# Patient Record
Sex: MALE | Race: WHITE | NOT HISPANIC OR LATINO
[De-identification: names, ages, dates, MRNs, and addresses within clinical notes are randomized per-mention and may not be internally consistent; named-entity substitution may affect disease eponyms.]

---

## 2020-11-03 ENCOUNTER — APPOINTMENT (OUTPATIENT)
Dept: PULMONOLOGY | Facility: HOSPITAL | Age: 50
End: 2020-11-03
Payer: COMMERCIAL

## 2020-11-03 VITALS — HEIGHT: 70 IN | WEIGHT: 260 LBS | BODY MASS INDEX: 37.22 KG/M2

## 2020-11-03 DIAGNOSIS — F17.200 NICOTINE DEPENDENCE, UNSPECIFIED, UNCOMPLICATED: ICD-10-CM

## 2020-11-03 PROBLEM — Z00.00 ENCOUNTER FOR PREVENTIVE HEALTH EXAMINATION: Status: ACTIVE | Noted: 2020-11-03

## 2020-11-03 PROCEDURE — 99213 OFFICE O/P EST LOW 20 MIN: CPT | Mod: 95

## 2020-11-03 NOTE — REASON FOR VISIT
[Home] : at home, [unfilled] , at the time of the visit. [Medical Office: ([facility])___] : at the medical office located in  [Verbal consent obtained from patient] : the patient, [unfilled] [Initial Evaluation] : an initial evaluation [FreeTextEntry1] : sleep apnea

## 2020-11-03 NOTE — ASSESSMENT
[FreeTextEntry1] : 50-year-old man with severe obstructive sleep apnea on sleep study, patient was advised deviated nasal septal surgery by his ENT physician but he could not do the surgery due to the Covid pandemic..\par \par Patient has not undergone any treatment until now but has significant symptoms.  Today we went over details of his previous study and the next steps.  I suggested him to start the CPAP right away and see me in few weeks to look at the download data.

## 2020-11-03 NOTE — REVIEW OF SYSTEMS
[EDS: ESS=____] : daytime somnolence: ESS=[unfilled] [Fatigue] : fatigue [Snoring] : snoring [Witnessed Apneas] : witnessed apnea [Negative] : Psychiatric

## 2020-11-03 NOTE — HISTORY OF PRESENT ILLNESS
[FreeTextEntry1] : 50 year old man  with history of chronic back pain, sleep apnea is here in the sleep center to address sleep apnea.  Patient is sleepy with [place] sleepiness score of 12.  Patient has very loud snoring which disturbs his wife, also has witnessed apneas.  Patient's bedtime is around 10 PM wakes up in the morning around 6 AM.  He feels tired when he wakes up.  \par Patient underwent a sleep study which showed severe obstructive sleep apnea, he saw Dr. Sawyer after the diagnosis and was advised deviated nasal septal surgery by his ENT.\par \par Today he is coming to get evaluated for his sleep apnea as he did not want to do the deviated nasal septal surgery secondary to Covid pandemic.\par

## 2020-11-09 DIAGNOSIS — Z87.81 PERSONAL HISTORY OF (HEALED) TRAUMATIC FRACTURE: ICD-10-CM

## 2020-11-09 DIAGNOSIS — Z86.69 PERSONAL HISTORY OF OTHER DISEASES OF THE NERVOUS SYSTEM AND SENSE ORGANS: ICD-10-CM

## 2020-11-09 DIAGNOSIS — Z87.898 PERSONAL HISTORY OF OTHER SPECIFIED CONDITIONS: ICD-10-CM

## 2020-11-09 DIAGNOSIS — Z86.79 PERSONAL HISTORY OF OTHER DISEASES OF THE CIRCULATORY SYSTEM: ICD-10-CM

## 2020-11-09 DIAGNOSIS — Z82.49 FAMILY HISTORY OF ISCHEMIC HEART DISEASE AND OTHER DISEASES OF THE CIRCULATORY SYSTEM: ICD-10-CM

## 2020-11-10 ENCOUNTER — APPOINTMENT (OUTPATIENT)
Dept: HEMATOLOGY ONCOLOGY | Facility: CLINIC | Age: 50
End: 2020-11-10
Payer: COMMERCIAL

## 2020-11-10 VITALS
HEIGHT: 70 IN | DIASTOLIC BLOOD PRESSURE: 90 MMHG | SYSTOLIC BLOOD PRESSURE: 116 MMHG | TEMPERATURE: 98 F | BODY MASS INDEX: 38.37 KG/M2 | WEIGHT: 268 LBS | RESPIRATION RATE: 18 BRPM | HEART RATE: 80 BPM | OXYGEN SATURATION: 96 %

## 2020-11-10 DIAGNOSIS — Z80.42 FAMILY HISTORY OF MALIGNANT NEOPLASM OF PROSTATE: ICD-10-CM

## 2020-11-10 DIAGNOSIS — R10.9 UNSPECIFIED ABDOMINAL PAIN: ICD-10-CM

## 2020-11-10 PROCEDURE — 99205 OFFICE O/P NEW HI 60 MIN: CPT

## 2020-11-10 PROCEDURE — 99072 ADDL SUPL MATRL&STAF TM PHE: CPT

## 2020-11-10 NOTE — ASSESSMENT
[FreeTextEntry1] : THis is a 49 y/o man with   polycythemia and  leukocytosis. Patient has hx of obesity and smoking and untreated SASHA\par \par 1) leukocytosis \par differential primary versus secondary or reactive causes \par -for primary causes such as bone marrow pathology rule out MPD and leukemia \par check flow cytometry , bcr abl , jak2\par -for secondary causes- most likely includes tobacco use which causes leukocytosis \par other secondary causes include malignacy or chronic infections such as lyme - send lyme tiiters , hepatitis teting \par advise ct of c/a/p to rule out malginacy danielle ct chest to rule out malginacy given smoking hx \par advise patient to quit smoking \par if wbc remains high may need bm bx \par \par 2) polycythemia \par again reviewed possible hemoconcentraion \par causes include primary such as polycythemia vera versus secondary such as due to SASHA or smoking  or tumors\par -check jak2 , calr , mpl\par -check coritosl testosterone and epo level \par -needs to quit smoking \par -needs to start treatment for SASHA \par -advise ct of chest to rule out cancer (patient is currently declinging any imaging) \par -ct abd pelvis to rule out tumors in liver or kidneys\par moniter hb and if cont to increase will need phlebotomy \par \par 3) obesity \par advise weight loss \par \par 4) smoking hx \par discussed extensively the possible risk of many different cancers and vascular problems \par \par dw patient and wife at length \par follow up in 3 weeks

## 2020-11-10 NOTE — HISTORY OF PRESENT ILLNESS
[de-identified] : Mr. Londono is a 50 year old male, who presented to the ER at [facility] approximately 1 month ago after he experienced an episode of vertigo at work. He was treated with IV fluids and medications. He was found to have an elevated WBC while there, total WBC: 18.4. hb was also elevated at 16, plt were normal \par \par Just prior to the episode of vertigo, he suffered a back injury and was taking 20 OTC Advil for 7-10 days\par \par A week following the ER visit, on 10/08/2020 he saw Dr Mina, his PCP for follow up, his WBC was 12.0.  \par Labs dating back to 2018 also show mildly elevated wbc \par \par He continues to have low back pain. He has been taking Advil at lower doses now.\par Patient was diagnosed with sleep apnea over 1 year ago and has had no treatment yet. Per his wife his symptoms have been going on for years \par Patient continues to smoke \par He has never had any ct scans of chest  [de-identified] : Colonoscopy: Never\par \par Feeling tired at times, still having some lower back pain \par Declines any new complaints

## 2020-11-10 NOTE — REVIEW OF SYSTEMS
[Fatigue] : fatigue [Palpitations] : palpitations [Negative] : Allergic/Immunologic [Fever] : no fever [Chills] : no chills [Night Sweats] : no night sweats [Recent Change In Weight] : ~T recent weight change [Chest Pain] : no chest pain [Lower Ext Edema] : no lower extremity edema [Shortness Of Breath] : no shortness of breath [Wheezing] : no wheezing [Cough] : no cough [SOB on Exertion] : no shortness of breath during exertion [FreeTextEntry2] : low grade fatigue, attributes to sleep apnea.  Has gained weight over the past 2-3 years.  [FreeTextEntry5] : occasional palpitations, will make appt with Dr Patino.

## 2020-11-10 NOTE — PHYSICAL EXAM
[Obese] : obese [Normal] : no peripheral adenopathy appreciated [Restricted in physically strenuous activity but ambulatory and able to carry out work of a light or sedentary nature] : Status 1- Restricted in physically strenuous activity but ambulatory and able to carry out work of a light or sedentary nature, e.g., light house work, office work [de-identified] : obese

## 2020-11-10 NOTE — REASON FOR VISIT
[Initial Consultation] : an initial consultation for [FreeTextEntry2] : leukocytosis, here for intial hematology evaluation

## 2020-11-16 LAB
ALBUMIN MFR SERPL ELPH: 60 %
ALBUMIN SERPL ELPH-MCNC: 4.5 G/DL
ALBUMIN SERPL-MCNC: 4.3 G/DL
ALBUMIN/GLOB SERPL: 1.5 RATIO
ALP BLD-CCNC: 100 U/L
ALPHA1 GLOB MFR SERPL ELPH: 4.3 %
ALPHA1 GLOB SERPL ELPH-MCNC: 0.3 G/DL
ALPHA2 GLOB MFR SERPL ELPH: 10.4 %
ALPHA2 GLOB SERPL ELPH-MCNC: 0.7 G/DL
ALT SERPL-CCNC: 36 U/L
AMINO ACID MPL: NORMAL
AMINO ACID: NORMAL
ANION GAP SERPL CALC-SCNC: 23 MMOL/L
ASSAY DETAILS MPL: NORMAL
ASSAY DETAILS: NORMAL
AST SERPL-CCNC: 18 U/L
B BURGDOR AB SER-IMP: NEGATIVE
B BURGDOR IGM PATRN SER IB-IMP: NEGATIVE
B BURGDOR18KD IGG SER QL IB: NORMAL
B BURGDOR23KD IGG SER QL IB: NORMAL
B BURGDOR23KD IGM SER QL IB: NORMAL
B BURGDOR28KD IGG SER QL IB: NORMAL
B BURGDOR30KD IGG SER QL IB: NORMAL
B BURGDOR31KD IGG SER QL IB: NORMAL
B BURGDOR39KD IGG SER QL IB: NORMAL
B BURGDOR39KD IGM SER QL IB: NORMAL
B BURGDOR41KD IGG SER QL IB: PRESENT
B BURGDOR41KD IGM SER QL IB: PRESENT
B BURGDOR45KD IGG SER QL IB: NORMAL
B BURGDOR58KD IGG SER QL IB: NORMAL
B BURGDOR66KD IGG SER QL IB: PRESENT
B BURGDOR93KD IGG SER QL IB: NORMAL
B-GLOBULIN MFR SERPL ELPH: 10.9 %
B-GLOBULIN SERPL ELPH-MCNC: 0.8 G/DL
BASOPHILS # BLD AUTO: 0.07 K/UL
BASOPHILS NFR BLD AUTO: 0.6 %
BILIRUB SERPL-MCNC: 0.4 MG/DL
BLOCK/SPECIMEN ID: NORMAL
BLOCK/SPECIMEN ID: NORMAL
BUN SERPL-MCNC: 17 MG/DL
CALCIUM SERPL-MCNC: 9.1 MG/DL
CHLORIDE SERPL-SCNC: 99 MMOL/L
CO2 SERPL-SCNC: 22 MMOL/L
CORTICOSTEROID BIND GLOBULIN: 2.1 MG/DL
CORTIS SERPL-MCNC: 10 UG/DL
CORTISOL, FREE: 0.66 UG/DL
CREAT SERPL-MCNC: 0.8 MG/DL
DEPRECATED KAPPA LC FREE/LAMBDA SER: 1.17 RATIO
DEPRECATED KAPPA LC FREE/LAMBDA SER: 1.17 RATIO
EOSINOPHIL # BLD AUTO: 0.28 K/UL
EOSINOPHIL NFR BLD AUTO: 2.2 %
EPO SERPL-MCNC: 11.1 MIU/ML
EXON MPL: NORMAL
EXON: NORMAL
FERRITIN SERPL-MCNC: 354 NG/ML
FOLATE SERPL-MCNC: 14 NG/ML
GAMMA GLOB FLD ELPH-MCNC: 1 G/DL
GAMMA GLOB MFR SERPL ELPH: 14.4 %
GENE MPL: NORMAL
GENE XXX MUT ANL BLD/T: NORMAL
GENE: NORMAL
GLUCOSE SERPL-MCNC: 36 MG/DL
HBV SURFACE AB SER QL: NONREACTIVE
HBV SURFACE AG SER QL: NONREACTIVE
HCT VFR BLD CALC: 52.3 %
HCV AB SER QL: NONREACTIVE
HCV S/CO RATIO: 0.21 S/CO
HGB BLD-MCNC: 16.4 G/DL
IGA SER QL IEP: 138 MG/DL
IGG SER QL IEP: 998 MG/DL
IGM SER QL IEP: 89 MG/DL
IMM GRANULOCYTES NFR BLD AUTO: 0.2 %
INTERPRETATION SERPL IEP-IMP: NORMAL
INTERPRETATION: NORMAL
IRON SATN MFR SERPL: 28 %
IRON SERPL-MCNC: 84 UG/DL
JAK2 12 INTERPRETATION: NORMAL
JAK2 12 MUTATIONS: NORMAL
JAK2 12 REFERENCE: NORMAL
JAK2 12-13 TYPE: NORMAL
JAK2 GENE MUT ANL BLD/T: NORMAL
JAK2 P.V617F BLD/T QL: NOT DETECTED
JAK2-SOURCE: NORMAL
KAPPA LC CSF-MCNC: 1.4 MG/DL
KAPPA LC CSF-MCNC: 1.4 MG/DL
KAPPA LC SERPL-MCNC: 1.64 MG/DL
KAPPA LC SERPL-MCNC: 1.64 MG/DL
LDH SERPL-CCNC: 354 U/L
LYMPHOCYTES # BLD AUTO: 3.45 K/UL
LYMPHOCYTES NFR BLD AUTO: 27.3 %
Lab: NORMAL
Lab: NORMAL
M PROTEIN SPEC IFE-MCNC: NORMAL
MAN DIFF?: NORMAL
MCHC RBC-ENTMCNC: 30.6 PG
MCHC RBC-ENTMCNC: 31.4 GM/DL
MCV RBC AUTO: 97.6 FL
MONOCYTES # BLD AUTO: 0.8 K/UL
MONOCYTES NFR BLD AUTO: 6.3 %
MPL EXON 10 MUTATION: NOT DETECTED
MPL SPECIMEN SOURCE: NORMAL
MUTATION TYPE: NORMAL
MUTFREQ: NORMAL
NEUTROPHILS # BLD AUTO: 8.03 K/UL
NEUTROPHILS NFR BLD AUTO: 63.4 %
NUCCHA: NORMAL
NUKLEOID CHANGE: NORMAL
PFCX: 6.6 %
PLATELET # BLD AUTO: 291 K/UL
POTASSIUM SERPL-SCNC: 4.4 MMOL/L
PROT SERPL-MCNC: 7.1 G/DL
RBC # BLD: 5.36 M/UL
RBC # FLD: 13.5 %
REFERENCE MPL: NORMAL
SODIUM SERPL-SCNC: 144 MMOL/L
T(9;22)(ABL1,BCR)/CONTROL BLD/T: NORMAL
TESTOST SERPL-MCNC: 198 NG/DL
TIBC SERPL-MCNC: 296 UG/DL
TSH SERPL-ACNC: 2.58 UIU/ML
UIBC SERPL-MCNC: 212 UG/DL
VIT B12 SERPL-MCNC: 476 PG/ML
WBC # FLD AUTO: 12.66 K/UL

## 2020-11-24 ENCOUNTER — APPOINTMENT (OUTPATIENT)
Dept: HEMATOLOGY ONCOLOGY | Facility: CLINIC | Age: 50
End: 2020-11-24
Payer: COMMERCIAL

## 2020-11-24 ENCOUNTER — TRANSCRIPTION ENCOUNTER (OUTPATIENT)
Age: 50
End: 2020-11-24

## 2020-11-24 VITALS
TEMPERATURE: 98 F | WEIGHT: 269 LBS | HEIGHT: 70 IN | HEART RATE: 85 BPM | SYSTOLIC BLOOD PRESSURE: 120 MMHG | OXYGEN SATURATION: 98 % | RESPIRATION RATE: 18 BRPM | DIASTOLIC BLOOD PRESSURE: 86 MMHG | BODY MASS INDEX: 38.51 KG/M2

## 2020-11-24 PROCEDURE — 99214 OFFICE O/P EST MOD 30 MIN: CPT

## 2020-11-24 NOTE — PHYSICAL EXAM
[Restricted in physically strenuous activity but ambulatory and able to carry out work of a light or sedentary nature] : Status 1- Restricted in physically strenuous activity but ambulatory and able to carry out work of a light or sedentary nature, e.g., light house work, office work [Obese] : obese [Normal] : no peripheral adenopathy appreciated [de-identified] : obese

## 2020-11-24 NOTE — ASSESSMENT
[FreeTextEntry1] : THis is a 49 y/o man with   polycythemia and  leukocytosis. Patient has hx of obesity and smoking and untreated SASHA\par \par 1) leukocytosis \par differential primary versus secondary or reactive causes \par -still mild elevation not sig worse \par -labs reviewed -negative for BCR abl  jak2 mutation, calr and mpl  makes CML   or MPD unlikely \par -also negative epo testosterone and cortisol \par -still advise ct of chest and abd pelvis \par -advise smoking cessation  dw patient at length \par - discussed bm bx if fails to improve with smoking cessation \par \par 2) polycythemia \par again reviewed possible causes include primary such as polycythemia vera versus secondary such as due to SASHA or smoking  or tumors\par -negative  jak2 , calr , mpl makes primary polycythemia vera  less likley \par -negative  coritosl testosterone and epo level \par -again discussed importance of  quitting smoking \par -again stressed importance of  treatment for SASHA \par -advise ct of chest to rule out cancer (cont to decline ) \par -ct abd pelvis to rule out tumors in liver or kidneys advised (he cont to decline ) \par -hb and hct is onl upper end of normal , repeat every 4-6 weeks start asa \par \par 3) obesity \par advise weight loss \par \par 4) smoking hx \par discussed extensively the possible risk of many different cancers and vascular problems  and the possibel contribution to high wbc and high hb \par \par dw patient   at length \par follow up in 4  weeks

## 2020-11-24 NOTE — HISTORY OF PRESENT ILLNESS
[de-identified] : Mr. Londono is a 50 year old male, who presented to the ER at [facility] approximately 1 month ago after he experienced an episode of vertigo at work. He was treated with IV fluids and medications. He was found to have an elevated WBC while there, total WBC: 18.4. hb was also elevated at 16, plt were normal \par \par Just prior to the episode of vertigo, he suffered a back injury and was taking 20 OTC Advil for 7-10 days\par \par A week following the ER visit, on 10/08/2020 he saw Dr Mina, his PCP for follow up, his WBC was 12.0.  \par Labs dating back to 2018 also show mildly elevated wbc \par \par He continues to have low back pain. He has been taking Advil at lower doses now.\par Patient was diagnosed with sleep apnea over 1 year ago and has had no treatment yet. Per his wife his symptoms have been going on for years \par Patient continues to smoke \par He has never had any ct scans of chest  [de-identified] : Colonoscopy: Never\par patient is still smoking and still has not started using his cpap regularly \par \par labs reviewed : normal epo level , normal ferritin, jak2 negative  bcr abl, , mpl neg, calr neg, normal testosterone and cortisol \par normal flow cytometry, neg spep \par \par has not done ct or ultraosund yet \par \par Feeling tired at times, still having some lower back pain \par Declines any new complaints

## 2020-11-24 NOTE — REASON FOR VISIT
[Initial Consultation] : an initial consultation for [FreeTextEntry2] : leukocytosis, here for  eval of high wbc and high hb

## 2020-11-24 NOTE — REVIEW OF SYSTEMS
[Fatigue] : fatigue [Recent Change In Weight] : ~T recent weight change [Palpitations] : palpitations [Negative] : Allergic/Immunologic [Fever] : no fever [Chills] : no chills [Night Sweats] : no night sweats [Chest Pain] : no chest pain [Lower Ext Edema] : no lower extremity edema [Shortness Of Breath] : no shortness of breath [Wheezing] : no wheezing [Cough] : no cough [SOB on Exertion] : no shortness of breath during exertion [FreeTextEntry2] : low grade fatigue, attributes to sleep apnea. .  [FreeTextEntry5] : occasional palpitations, will make appt with Dr Patino.

## 2021-01-06 ENCOUNTER — APPOINTMENT (OUTPATIENT)
Dept: HEMATOLOGY ONCOLOGY | Facility: CLINIC | Age: 51
End: 2021-01-06
Payer: COMMERCIAL

## 2021-01-06 VITALS
TEMPERATURE: 98 F | BODY MASS INDEX: 39.37 KG/M2 | HEIGHT: 70 IN | OXYGEN SATURATION: 99 % | RESPIRATION RATE: 19 BRPM | DIASTOLIC BLOOD PRESSURE: 92 MMHG | HEART RATE: 86 BPM | SYSTOLIC BLOOD PRESSURE: 137 MMHG | WEIGHT: 275 LBS

## 2021-01-06 PROCEDURE — 99072 ADDL SUPL MATRL&STAF TM PHE: CPT

## 2021-01-06 PROCEDURE — 99214 OFFICE O/P EST MOD 30 MIN: CPT

## 2021-01-06 NOTE — REASON FOR VISIT
[Follow-Up Visit] : a follow-up visit for [FreeTextEntry2] : leukocytosis, here for  eval of high wbc and high hb

## 2021-01-06 NOTE — HISTORY OF PRESENT ILLNESS
[de-identified] : Mr. Londono is a 50 year old male, who presented to the ER at [facility] approximately 1 month ago after he experienced an episode of vertigo at work. He was treated with IV fluids and medications. He was found to have an elevated WBC while there, total WBC: 18.4. hb was also elevated at 16, plt were normal \par \par Just prior to the episode of vertigo, he suffered a back injury and was taking 20 OTC Advil for 7-10 days\par \par A week following the ER visit, on 10/08/2020 he saw Dr Mina, his PCP for follow up, his WBC was 12.0.  \par Labs dating back to 2018 also show mildly elevated wbc \par \par He continues to have low back pain. He has been taking Advil at lower doses now.\par Patient was diagnosed with sleep apnea over 1 year ago and has had no treatment yet. Per his wife his symptoms have been going on for years \par Patient continues to smoke \par \par \par labs reviewed : normal epo level , normal ferritin, jak2 negative  bcr abl, , mpl neg, calr neg, normal testosterone and cortisol \par normal flow cytometry, neg spep \par  [de-identified] : \par occ doing cpap , sleep study? redo in future \par still smoking , wants to quit \par saw cardioloy supposed to do stress test , not scheduled \par ct of chest- lung nodules \par ultrasoun d- shows possible liver lesion

## 2021-01-06 NOTE — PHYSICAL EXAM
[Restricted in physically strenuous activity but ambulatory and able to carry out work of a light or sedentary nature] : Status 1- Restricted in physically strenuous activity but ambulatory and able to carry out work of a light or sedentary nature, e.g., light house work, office work [Obese] : obese [Normal] : grossly intact [de-identified] : obese

## 2021-01-06 NOTE — ASSESSMENT
[FreeTextEntry1] : THis is a 49 y/o man with   polycythemia and  leukocytosis. Patient has hx of obesity and smoking and untreated SAHSA\par \par 1) leukocytosis \par differential primary versus secondary or reactive causes \par -wbc is normal today \par -work up negative for BCR abl  jak2 mutation, calr and mpl, epo testosterone and cortisol \par -ecouraged smoking cessation , gave script for wellbutrin \par -ct scan shows lung nodules , needs follow up and also do ct of abd pelvis now \par \par 2) polycythemia \par again reviewed possible causes include primary such as polycythemia vera versus secondary such as due to SASHA or smoking  or tumors\par -negative  jak2 , calr , mpl makes primary polycythemia vera  less likley \par -negative  coritosl testosterone and epo level \par -again discussed importance of  quitting smoking  start wellbutrin \par -advise follo wup with pulmonary  for SASHA \par -hb is still mildly elevated but stable \par -lung nodules on scans get ct of abd , will need repeat ct chest \par -discussed possible bm bx but will hold until patient gets sleep study and also has quit smoking \par \par \par 3) obesity \par advise weight loss \par \par 4) smoking hx \par discussed extensively the possible risk of many different cancers and vascular problems  and the possibel contribution to high wbc and high hb \par start well butrin \par \par 5) lung nodule \par get ct of abd pelvis \par repeat in 6 months \par \par 6) liver lesiion \par get ct of abd pelvis \par \par \par \par dw patient   at length \par follow up for results of ct scan \par follow up in 2 months MD

## 2021-01-09 LAB — FERRITIN SERPL-MCNC: 333 NG/ML

## 2021-03-03 ENCOUNTER — APPOINTMENT (OUTPATIENT)
Dept: HEMATOLOGY ONCOLOGY | Facility: CLINIC | Age: 51
End: 2021-03-03

## 2021-03-03 ENCOUNTER — NON-APPOINTMENT (OUTPATIENT)
Age: 51
End: 2021-03-03

## 2021-03-11 ENCOUNTER — NON-APPOINTMENT (OUTPATIENT)
Age: 51
End: 2021-03-11

## 2021-03-12 ENCOUNTER — APPOINTMENT (OUTPATIENT)
Dept: HEMATOLOGY ONCOLOGY | Facility: CLINIC | Age: 51
End: 2021-03-12

## 2021-03-30 ENCOUNTER — RESULT REVIEW (OUTPATIENT)
Age: 51
End: 2021-03-30

## 2021-03-31 ENCOUNTER — APPOINTMENT (OUTPATIENT)
Dept: HEMATOLOGY ONCOLOGY | Facility: CLINIC | Age: 51
End: 2021-03-31

## 2021-04-12 NOTE — REVIEW OF SYSTEMS
[Fever] : no fever [Chills] : no chills [Night Sweats] : no night sweats [Chest Pain] : no chest pain [Lower Ext Edema] : no lower extremity edema [Shortness Of Breath] : no shortness of breath [Wheezing] : no wheezing [Cough] : no cough [SOB on Exertion] : no shortness of breath during exertion [FreeTextEntry2] : low grade fatigue, attributes to sleep apnea. .  [FreeTextEntry5] : occasional palpitations, will make appt with Dr Patino.

## 2021-04-12 NOTE — ASSESSMENT
[FreeTextEntry1] : THis is a 51 y/o man with   polycythemia and  leukocytosis. Patient has hx of obesity and smoking and untreated SASHA\par \par 1) leukocytosis \par differential primary versus secondary or reactive causes \par -wbc is normal today \par -work up negative for BCR abl  jak2 mutation, calr and mpl, epo testosterone and cortisol \par -ecouraged smoking cessation , gave script for wellbutrin \par -ct scan shows lung nodules , needs follow up and also do ct of abd pelvis now \par \par 2) polycythemia \par again reviewed possible causes include primary such as polycythemia vera versus secondary such as due to SASHA or smoking  or tumors\par -negative  jak2 , calr , mpl makes primary polycythemia vera  less likley \par -negative  coritosl testosterone and epo level \par -again discussed importance of  quitting smoking  start wellbutrin \par -advise follo wup with pulmonary  for SASHA \par -hb is still mildly elevated but stable \par -lung nodules on scans get ct of abd , will need repeat ct chest \par -discussed possible bm bx but will hold until patient gets sleep study and also has quit smoking \par \par \par 3) obesity \par advise weight loss \par \par 4) smoking hx \par discussed extensively the possible risk of many different cancers and vascular problems  and the possibel contribution to high wbc and high hb \par start well butrin \par \par 5) lung nodule \par get ct of abd pelvis \par repeat in 6 months \par \par 6) liver lesiion \par get ct of abd pelvis \par \par \par \par dw patient   at length \par follow up for results of ct scan \par follow up in 2 months MD

## 2021-04-12 NOTE — HISTORY OF PRESENT ILLNESS
[de-identified] : Mr. Londono is a 50 year old male, who presented to the ER at [facility] approximately 1 month ago after he experienced an episode of vertigo at work. He was treated with IV fluids and medications. He was found to have an elevated WBC while there, total WBC: 18.4. hb was also elevated at 16, plt were normal \par \par Just prior to the episode of vertigo, he suffered a back injury and was taking 20 OTC Advil for 7-10 days\par \par A week following the ER visit, on 10/08/2020 he saw Dr Mina, his PCP for follow up, his WBC was 12.0.  \par Labs dating back to 2018 also show mildly elevated wbc \par \par He continues to have low back pain. He has been taking Advil at lower doses now.\par Patient was diagnosed with sleep apnea over 1 year ago and has had no treatment yet. Per his wife his symptoms have been going on for years \par Patient continues to smoke \par \par \par labs reviewed : normal epo level , normal ferritin, jak2 negative  bcr abl, , mpl neg, calr neg, normal testosterone and cortisol \par normal flow cytometry, neg spep \par  [de-identified] : \par occ doing cpap , sleep study? redo in future \par still smoking , wants to quit \par saw cardioloy supposed to do stress test , not scheduled \par ct of chest- lung nodules \par ultrasoun d- shows possible liver lesion

## 2021-04-13 ENCOUNTER — NON-APPOINTMENT (OUTPATIENT)
Age: 51
End: 2021-04-13

## 2021-04-13 ENCOUNTER — APPOINTMENT (OUTPATIENT)
Dept: HEMATOLOGY ONCOLOGY | Facility: CLINIC | Age: 51
End: 2021-04-13

## 2021-05-19 ENCOUNTER — APPOINTMENT (OUTPATIENT)
Dept: HEMATOLOGY ONCOLOGY | Facility: CLINIC | Age: 51
End: 2021-05-19

## 2021-05-28 ENCOUNTER — APPOINTMENT (OUTPATIENT)
Dept: HEMATOLOGY ONCOLOGY | Facility: CLINIC | Age: 51
End: 2021-05-28
Payer: COMMERCIAL

## 2021-05-28 VITALS
DIASTOLIC BLOOD PRESSURE: 87 MMHG | BODY MASS INDEX: 39.37 KG/M2 | WEIGHT: 275 LBS | HEIGHT: 70 IN | OXYGEN SATURATION: 98 % | RESPIRATION RATE: 18 BRPM | TEMPERATURE: 97.9 F | HEART RATE: 83 BPM | SYSTOLIC BLOOD PRESSURE: 154 MMHG

## 2021-05-28 PROCEDURE — 99214 OFFICE O/P EST MOD 30 MIN: CPT

## 2021-05-28 PROCEDURE — 99072 ADDL SUPL MATRL&STAF TM PHE: CPT

## 2021-05-28 NOTE — ASSESSMENT
[FreeTextEntry1] : THis is a 49 y/o man with   polycythemia and  leukocytosis. Patient has hx of obesity and smoking and untreated SASHA\par \par 1) leukocytosis \par differential primary versus secondary or reactive causes \par -work up negative for BCR abl  jak2 mutation, calr and mpl, epo testosterone and cortisol \par -again discussed need to quit smoking \par -ct scan shows lung nodules , needs follow up and also do ct of abd pelvis now \par - discussed need for bm bx to rule out MPD \par \par 2) polycythemia \par again reviewed possible causes include primary such as polycythemia vera versus secondary such as due to SASHA or smoking  or tumors\par -negative  jak2 , calr , mpl makes primary polycythemia vera  less likley \par -negative  coritosl testosterone and epo level \par -again stressed importance of quiting smoking and compliance with CPAP \par -repeat cbc \par - repeat ct scan of lung follow up now(6) \par -mri of liver reviewed - repeat in 6 months \par \par \par 3) obesity \par advise weight loss \par \par 4) smoking hx \par discussed extensively the possible risk of many different cancers and vascular problems  and the possibel contribution to high wbc and high hb \par advised to quit smoking \par \par 5) lung nodule \par mri of abd negative for maliangnancy \par needs repeat ct scan chest now \par \par 6) liver lesiion \par mri of abd reviewed --> hemangioma \par repeat in 6 months \par check afp \par \par \par \par dw patient  and wife at length \par follow up in 3 months

## 2021-05-28 NOTE — HISTORY OF PRESENT ILLNESS
[de-identified] : feeling well but is tired at times \par hasn’t been using cpap , trouble breathing \par saw ent , suggested surgery on septum \par did video call , pulmonologist - sent machine \par still smoking - trying to smoke \par cardiology did stress test - heart ok \par mri of liver - fatty liver , lesion 4.2 cm  consistant with hemangioma and bilateral renal cyst  [de-identified] : Mr. Londono is a 50 year old male, who presented to the ER at [facility] approximately 1 month ago after he experienced an episode of vertigo at work. He was treated with IV fluids and medications. He was found to have an elevated WBC while there, total WBC: 18.4. hb was also elevated at 16, plt were normal \par \par Just prior to the episode of vertigo, he suffered a back injury and was taking 20 OTC Advil for 7-10 days\par \par A week following the ER visit, on 10/08/2020 he saw Dr Mina, his PCP for follow up, his WBC was 12.0.  \par Labs dating back to 2018 also show mildly elevated wbc \par \par He continues to have low back pain. He has been taking Advil at lower doses now.\par Patient was diagnosed with sleep apnea over 1 year ago and has had no treatment yet. Per his wife his symptoms have been going on for years \par Patient continues to smoke \par \par \par labs reviewed : normal epo level , normal ferritin, jak2 negative  bcr abl, , mpl neg, calr neg, normal testosterone and cortisol \par normal flow cytometry, neg spep \par \par ct of chest- lung nodules \par ultrasoun d- shows possible liver lesion

## 2021-05-30 LAB
AFP-TM SERPL-MCNC: 2.3 NG/ML
ALBUMIN SERPL ELPH-MCNC: 4.4 G/DL
ALP BLD-CCNC: 103 U/L
ALT SERPL-CCNC: 40 U/L
ANION GAP SERPL CALC-SCNC: 25 MMOL/L
AST SERPL-CCNC: 19 U/L
BASOPHILS # BLD AUTO: 0.06 K/UL
BASOPHILS NFR BLD AUTO: 0.5 %
BILIRUB SERPL-MCNC: 0.5 MG/DL
BUN SERPL-MCNC: 18 MG/DL
CALCIUM SERPL-MCNC: 9.1 MG/DL
CHLORIDE SERPL-SCNC: 101 MMOL/L
CO2 SERPL-SCNC: 17 MMOL/L
CREAT SERPL-MCNC: 0.82 MG/DL
EOSINOPHIL # BLD AUTO: 0.35 K/UL
EOSINOPHIL NFR BLD AUTO: 3.1 %
GLUCOSE SERPL-MCNC: 105 MG/DL
HCT VFR BLD CALC: 50.2 %
HGB BLD-MCNC: 15.8 G/DL
IMM GRANULOCYTES NFR BLD AUTO: 0.4 %
LDH SERPL-CCNC: 296 U/L
LYMPHOCYTES # BLD AUTO: 2.94 K/UL
LYMPHOCYTES NFR BLD AUTO: 26.1 %
MAN DIFF?: NORMAL
MCHC RBC-ENTMCNC: 29.9 PG
MCHC RBC-ENTMCNC: 31.5 GM/DL
MCV RBC AUTO: 95.1 FL
MONOCYTES # BLD AUTO: 0.76 K/UL
MONOCYTES NFR BLD AUTO: 6.7 %
NEUTROPHILS # BLD AUTO: 7.13 K/UL
NEUTROPHILS NFR BLD AUTO: 63.2 %
PLATELET # BLD AUTO: 284 K/UL
POTASSIUM SERPL-SCNC: 4.4 MMOL/L
PROT SERPL-MCNC: 6.7 G/DL
RBC # BLD: 5.28 M/UL
RBC # FLD: 13.8 %
SODIUM SERPL-SCNC: 144 MMOL/L
WBC # FLD AUTO: 11.28 K/UL

## 2021-06-06 LAB
CORTICOSTEROID BIND GLOBULIN: 1.8 MG/DL
CORTIS SERPL-MCNC: 11 UG/DL
CORTISOL, FREE: 1.3 UG/DL
EPO SERPL-MCNC: 11.6 MIU/ML
PFCX: 12 %

## 2021-06-07 ENCOUNTER — NON-APPOINTMENT (OUTPATIENT)
Age: 51
End: 2021-06-07

## 2021-06-07 ENCOUNTER — APPOINTMENT (OUTPATIENT)
Dept: PULMONOLOGY | Facility: HOSPITAL | Age: 51
End: 2021-06-07
Payer: COMMERCIAL

## 2021-06-07 VITALS
WEIGHT: 275 LBS | BODY MASS INDEX: 39.37 KG/M2 | SYSTOLIC BLOOD PRESSURE: 140 MMHG | HEIGHT: 70 IN | HEART RATE: 83 BPM | DIASTOLIC BLOOD PRESSURE: 70 MMHG

## 2021-06-07 PROCEDURE — 99214 OFFICE O/P EST MOD 30 MIN: CPT

## 2021-06-07 RX ORDER — BUPROPION HYDROCHLORIDE 150 MG/1
150 TABLET, FILM COATED, EXTENDED RELEASE ORAL
Qty: 60 | Refills: 5 | Status: DISCONTINUED | COMMUNITY
Start: 2021-01-06 | End: 2021-06-07

## 2021-06-07 RX ORDER — IBUPROFEN 200 MG/1
200 TABLET, COATED ORAL
Refills: 0 | Status: DISCONTINUED | COMMUNITY
End: 2021-06-07

## 2021-06-07 NOTE — HISTORY OF PRESENT ILLNESS
[FreeTextEntry1] : 51 year old man  with history of chronic back pain, polycythmia and elevated white count, sleep apnea is here in the sleep center to address sleep apnea.  Patient is sleepy with [place] sleepiness score of 12.  Patient has very loud snoring which disturbs his wife, also has witnessed apneas.  Patient's bedtime is around 10 PM wakes up in the morning around 6 AM.  He feels tired when he wakes up.  \par \par He is given CPAP due to severity of apnea with AHI of 58.\par \par Mr. Londono has difficulty with the CPAP machine, will give him another nasal mask today to see if he has better luck in using it.  We discussed about alternative treatment choices today.  Patient has not underwent a study in more than 2 years.  Will repeat a sleep study to assess the current degree of apnea and then consider inspire or mandibular advancement surgery.\par

## 2021-06-07 NOTE — PHYSICAL EXAM
[General Appearance - Well Developed] : well developed [General Appearance - Well Nourished] : well nourished [II] : II [Neck Cervical Mass (___cm)] : no neck mass was observed [Jugular Venous Distention Increased] : there was no jugular-venous distention [Heart Sounds] : normal S1 and S2 [Murmurs] : no murmurs [] : no respiratory distress [Auscultation Breath Sounds / Voice Sounds] : lungs were clear to auscultation bilaterally [FreeTextEntry1] : no edema [No Focal Deficits] : no focal deficits [Oriented To Time, Place, And Person] : oriented to person, place, and time [Memory Recent] : recent memory was not impaired

## 2021-06-07 NOTE — ASSESSMENT
[FreeTextEntry1] : 51-year-old man with severe obstructive sleep apnea has difficulty using the CPAP machine.\par \par I asked him to continue to try using the CPAP machine until we figure out alternative treatment choices given the polycythemia and also elevated white count.\par \par We will repeat a sleep study to assess the current degree of apnea and see if he will qualify for alternative choices.  Patient's weight will be an issue as well and his BMI is 39 he may not qualify for inspire therapy at this point, I asked him to lose weight to see if he will qualify for inspire therapy or will consider mandibular advancement surgery.\par \par Also discussed about smoking cessation, he still smokes more than half pack a day.

## 2021-09-14 ENCOUNTER — APPOINTMENT (OUTPATIENT)
Dept: HEMATOLOGY ONCOLOGY | Facility: CLINIC | Age: 51
End: 2021-09-14
Payer: COMMERCIAL

## 2021-09-14 VITALS
RESPIRATION RATE: 18 BRPM | SYSTOLIC BLOOD PRESSURE: 142 MMHG | BODY MASS INDEX: 39.94 KG/M2 | OXYGEN SATURATION: 96 % | WEIGHT: 279 LBS | TEMPERATURE: 98.9 F | HEIGHT: 70 IN | DIASTOLIC BLOOD PRESSURE: 95 MMHG | HEART RATE: 77 BPM

## 2021-09-14 DIAGNOSIS — Z72.0 TOBACCO USE: ICD-10-CM

## 2021-09-14 DIAGNOSIS — G89.29 DORSALGIA, UNSPECIFIED: ICD-10-CM

## 2021-09-14 DIAGNOSIS — G47.33 OBSTRUCTIVE SLEEP APNEA (ADULT) (PEDIATRIC): ICD-10-CM

## 2021-09-14 DIAGNOSIS — M54.9 DORSALGIA, UNSPECIFIED: ICD-10-CM

## 2021-09-14 PROCEDURE — 99214 OFFICE O/P EST MOD 30 MIN: CPT

## 2021-09-19 LAB
AFP-TM SERPL-MCNC: 2.4 NG/ML
FERRITIN SERPL-MCNC: 401 NG/ML

## 2021-09-22 PROBLEM — G47.33 OBSTRUCTIVE SLEEP APNEA, ADULT: Status: ACTIVE | Noted: 2020-11-03

## 2021-09-22 PROBLEM — M54.9 CHRONIC BACK PAIN: Status: ACTIVE | Noted: 2020-11-03

## 2021-09-22 PROBLEM — Z72.0 TOBACCO ABUSE: Status: ACTIVE | Noted: 2020-11-10

## 2022-01-19 ENCOUNTER — APPOINTMENT (OUTPATIENT)
Dept: HEMATOLOGY ONCOLOGY | Facility: CLINIC | Age: 52
End: 2022-01-19

## 2023-01-10 ENCOUNTER — APPOINTMENT (OUTPATIENT)
Dept: HEMATOLOGY ONCOLOGY | Facility: CLINIC | Age: 53
End: 2023-01-10
Payer: COMMERCIAL

## 2023-01-10 VITALS
HEIGHT: 70 IN | DIASTOLIC BLOOD PRESSURE: 82 MMHG | SYSTOLIC BLOOD PRESSURE: 123 MMHG | WEIGHT: 294 LBS | TEMPERATURE: 99.3 F | RESPIRATION RATE: 18 BRPM | HEART RATE: 77 BPM | BODY MASS INDEX: 42.09 KG/M2 | OXYGEN SATURATION: 96 %

## 2023-01-10 PROCEDURE — 99213 OFFICE O/P EST LOW 20 MIN: CPT | Mod: 25

## 2023-01-10 PROCEDURE — 36415 COLL VENOUS BLD VENIPUNCTURE: CPT

## 2023-01-10 RX ORDER — AMOXICILLIN AND CLAVULANATE POTASSIUM 500; 125 MG/1; MG/1
500-125 TABLET, FILM COATED ORAL
Qty: 14 | Refills: 0 | Status: DISCONTINUED | COMMUNITY
Start: 2023-01-10 | End: 2023-01-10

## 2023-01-17 ENCOUNTER — APPOINTMENT (OUTPATIENT)
Dept: HEMATOLOGY ONCOLOGY | Facility: CLINIC | Age: 53
End: 2023-01-17

## 2023-01-19 NOTE — ASSESSMENT
[FreeTextEntry1] : THis is a 51 y/o man with   polycythemia and  leukocytosis. Patient has hx of obesity and smoking and untreated SASHA\par \par 1) leukocytosis \par differential primary versus secondary or reactive causes \par -work up negative for BCR abl  jak2 mutation, calr and mpl, epo testosterone and cortisol \par -again discussed need to quit smoking \par --  to consider bmbx \par \par 2) polycythemia \par again reviewed possible causes include primary such as polycythemia vera versus secondary such as due to SASHA or smoking  or tumors\par -negative  jak2 , calr , mpl makes primary polycythemia vera  less likley \par -negative  coritosl testosterone and epo level \par -again stressed importance of quiting smoking and compliance with CPAP \par -repeat cbc \par - repeat ct scan of lung follow up now(ordered 1/10/23\par -needs repeat MRI /u/s to f/u on hemangioma of the liver\par \par Lung nodules --needs repeat CT chest\par

## 2023-01-19 NOTE — PHYSICAL EXAM
[Restricted in physically strenuous activity but ambulatory and able to carry out work of a light or sedentary nature] : Status 1- Restricted in physically strenuous activity but ambulatory and able to carry out work of a light or sedentary nature, e.g., light house work, office work [Obese] : obese [Normal] : grossly intact [de-identified] : obese [de-identified] : plethroic

## 2023-01-19 NOTE — REVIEW OF SYSTEMS
[Fatigue] : fatigue [Recent Change In Weight] : ~T recent weight change [Palpitations] : palpitations [Negative] : Allergic/Immunologic [Fever] : no fever [Chills] : no chills [Night Sweats] : no night sweats [Chest Pain] : no chest pain [Lower Ext Edema] : no lower extremity edema [Shortness Of Breath] : no shortness of breath [Wheezing] : no wheezing [Cough] : no cough [SOB on Exertion] : no shortness of breath during exertion [FreeTextEntry2] : low grade fatigue, attributes to sleep apnea. .  [FreeTextEntry5] : occasional palpitations, s/p cardiology appt

## 2023-01-19 NOTE — HISTORY OF PRESENT ILLNESS
[de-identified] : Mr. Londono is a 50 year old male, who presented to the ER at [facility] approximately 1 month ago after he experienced an episode of vertigo at work. He was treated with IV fluids and medications. He was found to have an elevated WBC while there, total WBC: 18.4. hb was also elevated at 16, plt were normal \par \par He is alsi being monitored for lung nodules\par \par

## 2023-07-17 DIAGNOSIS — D72.829 ELEVATED WHITE BLOOD CELL COUNT, UNSPECIFIED: ICD-10-CM

## 2023-07-17 DIAGNOSIS — D75.1 SECONDARY POLYCYTHEMIA: ICD-10-CM

## 2023-07-17 DIAGNOSIS — J44.9 CHRONIC OBSTRUCTIVE PULMONARY DISEASE, UNSPECIFIED: ICD-10-CM

## 2023-07-17 DIAGNOSIS — R53.83 OTHER FATIGUE: ICD-10-CM

## 2023-07-17 DIAGNOSIS — R91.8 OTHER NONSPECIFIC ABNORMAL FINDING OF LUNG FIELD: ICD-10-CM

## 2023-07-17 DIAGNOSIS — K76.9 LIVER DISEASE, UNSPECIFIED: ICD-10-CM

## 2023-07-18 ENCOUNTER — APPOINTMENT (OUTPATIENT)
Dept: HEMATOLOGY ONCOLOGY | Facility: CLINIC | Age: 53
End: 2023-07-18

## 2024-09-15 ENCOUNTER — NON-APPOINTMENT (OUTPATIENT)
Age: 54
End: 2024-09-15

## 2024-10-16 NOTE — REVIEW OF SYSTEMS
[Fever] : no fever [Night Sweats] : no night sweats [Chills] : no chills [Chest Pain] : no chest pain [Lower Ext Edema] : no lower extremity edema [Shortness Of Breath] : no shortness of breath [Wheezing] : no wheezing [Cough] : no cough [SOB on Exertion] : no shortness of breath during exertion [FreeTextEntry2] : low grade fatigue, attributes to sleep apnea. .  [FreeTextEntry5] : occasional palpitations, s/p cardiology appt  No

## 2025-03-24 DIAGNOSIS — Z87.898 PERSONAL HISTORY OF OTHER SPECIFIED CONDITIONS: ICD-10-CM

## 2025-03-24 DIAGNOSIS — Z86.2 PERSONAL HISTORY OF DISEASES OF THE BLOOD AND BLOOD-FORMING ORGANS AND CERTAIN DISORDERS INVOLVING THE IMMUNE MECHANISM: ICD-10-CM

## 2025-03-25 ENCOUNTER — APPOINTMENT (OUTPATIENT)
Age: 55
End: 2025-03-25
Payer: COMMERCIAL

## 2025-03-25 VITALS
HEIGHT: 69.69 IN | HEART RATE: 90 BPM | WEIGHT: 297 LBS | SYSTOLIC BLOOD PRESSURE: 126 MMHG | DIASTOLIC BLOOD PRESSURE: 94 MMHG | BODY MASS INDEX: 43 KG/M2 | OXYGEN SATURATION: 97 %

## 2025-03-25 DIAGNOSIS — E66.01 MORBID (SEVERE) OBESITY DUE TO EXCESS CALORIES: ICD-10-CM

## 2025-03-25 DIAGNOSIS — Z82.3 FAMILY HISTORY OF STROKE: ICD-10-CM

## 2025-03-25 DIAGNOSIS — Z84.1 FAMILY HISTORY OF DISORDERS OF KIDNEY AND URETER: ICD-10-CM

## 2025-03-25 DIAGNOSIS — Z72.0 TOBACCO USE: ICD-10-CM

## 2025-03-25 DIAGNOSIS — I10 ESSENTIAL (PRIMARY) HYPERTENSION: ICD-10-CM

## 2025-03-25 DIAGNOSIS — G47.33 OBSTRUCTIVE SLEEP APNEA (ADULT) (PEDIATRIC): ICD-10-CM

## 2025-03-25 PROCEDURE — 99205 OFFICE O/P NEW HI 60 MIN: CPT

## 2025-03-25 RX ORDER — TIRZEPATIDE 2.5 MG/.5ML
2.5 INJECTION, SOLUTION SUBCUTANEOUS
Qty: 4 | Refills: 1 | Status: ACTIVE | COMMUNITY
Start: 2025-03-25 | End: 1900-01-01

## 2025-04-24 ENCOUNTER — APPOINTMENT (OUTPATIENT)
Age: 55
End: 2025-04-24
Payer: COMMERCIAL

## 2025-04-24 VITALS — BODY MASS INDEX: 42.71 KG/M2 | WEIGHT: 295 LBS

## 2025-04-24 DIAGNOSIS — E66.01 MORBID (SEVERE) OBESITY DUE TO EXCESS CALORIES: ICD-10-CM

## 2025-04-24 DIAGNOSIS — G47.33 OBSTRUCTIVE SLEEP APNEA (ADULT) (PEDIATRIC): ICD-10-CM

## 2025-04-24 PROCEDURE — 99213 OFFICE O/P EST LOW 20 MIN: CPT | Mod: 95
